# Patient Record
(demographics unavailable — no encounter records)

---

## 2025-05-22 NOTE — ASSESSMENT
[FreeTextEntry1] : # Constipation # BRBPR # Dyspepsia -Check basic blood work & UBT -Trial miralax BID x 3 - 7 days for purge then sw to fiber supp -Counseled on COL including indications, alternates, benefits, and risks (including bleeding, infection, and perforation)  -Schedule COL at [facility] with Golyt[facility] -Counseled on procedure benefits, risks, need for escort, and prep  FU post op

## 2025-05-22 NOTE — PHYSICAL EXAM
[Alert] : alert [Normal Voice/Communication] : normal voice/communication [No Acute Distress] : no acute distress [Normal Appearance] : the appearance of the neck was normal [No Neck Mass] : no neck mass was observed [No Respiratory Distress] : no respiratory distress [No Acc Muscle Use] : no accessory muscle use [No Masses] : no abdominal mass palpated [Abdomen Soft] : soft [de-identified] : Mild generalized TTP w/o peritoneal signs including epigastric area